# Patient Record
Sex: FEMALE | Race: WHITE | NOT HISPANIC OR LATINO | Employment: STUDENT | URBAN - METROPOLITAN AREA
[De-identification: names, ages, dates, MRNs, and addresses within clinical notes are randomized per-mention and may not be internally consistent; named-entity substitution may affect disease eponyms.]

---
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
## 2020-01-07 ENCOUNTER — OFFICE VISIT (OUTPATIENT)
Dept: FAMILY MEDICINE CLINIC | Facility: CLINIC | Age: 20
End: 2020-01-07

## 2020-01-07 VITALS
DIASTOLIC BLOOD PRESSURE: 87 MMHG | SYSTOLIC BLOOD PRESSURE: 133 MMHG | OXYGEN SATURATION: 100 % | HEART RATE: 76 BPM | WEIGHT: 149.9 LBS | HEIGHT: 63 IN | BODY MASS INDEX: 26.56 KG/M2 | TEMPERATURE: 97.1 F

## 2020-01-07 DIAGNOSIS — F90.0 ADHD, PREDOMINANTLY INATTENTIVE TYPE: Primary | ICD-10-CM

## 2020-01-07 PROCEDURE — 99213 OFFICE O/P EST LOW 20 MIN: CPT | Performed by: FAMILY MEDICINE

## 2020-01-07 RX ORDER — VALACYCLOVIR HYDROCHLORIDE 500 MG/1
500 TABLET, FILM COATED ORAL 2 TIMES DAILY
COMMUNITY
Start: 2019-11-27

## 2020-01-07 NOTE — PROGRESS NOTES
"Subjective   Raina Rodriguez is a 19 y.o. female.     Chief Complaint   Patient presents with   • ADHD        History of Present Illness    Follow-up attention deficit disorder.  She continues Vyvanse.  She ran out over the holidays.  She does not take it on weekends.  No troubles with weight loss from the medication.  No insomnia or agitation.  She is had some decreased motivation this last semester.  She is working as a nanny also.  She does not feel sad or depressed.  No depression symptoms.  Minimal to no alcohol use.  No marijuana or other drug use.  Otherwise feels well.      The following portions of the patient's history were reviewed and updated as appropriate: allergies, current medications, past family history, past medical history, past social history, past surgical history and problem list.          Review of Systems   Constitutional: Negative.    Respiratory: Negative.    Cardiovascular: Negative.    Musculoskeletal: Negative.        Objective   Blood pressure 133/87, pulse 76, temperature 97.1 °F (36.2 °C), temperature source Oral, height 160 cm (62.99\"), weight 68 kg (149 lb 14.4 oz), SpO2 100 %.  Physical Exam   Constitutional: She appears well-developed and well-nourished. No distress.   Neck: No thyromegaly present.   Cardiovascular: Normal rate, regular rhythm, normal heart sounds and intact distal pulses.   Pulmonary/Chest: Effort normal and breath sounds normal.   Musculoskeletal: She exhibits no edema.   Skin: Skin is warm and dry.   Psychiatric: She has a normal mood and affect. Her behavior is normal. Judgment and thought content normal.   Nursing note and vitals reviewed.      Assessment/Plan   Raina was seen today for adhd.    Diagnoses and all orders for this visit:    ADHD, predominantly inattentive type  -     Pain Management Profile (13 Drugs) Urine - Urine, Clean Catch    Other orders  -     lisdexamfetamine (VYVANSE) 30 MG capsule; Take 1 capsule by mouth Every Morning for attention " deficit disorder      Attention deficit disorder.  Continues Vyvanse.  Document neuropsychological testing on chart.  Routine urine drug screen ordered today per protocol.  I will see her back in about 4 months for recheck.  She will call with concerns.    She has had some decreased motivation the last semester.  No depression episodes or issues.  We will continue to monitor.

## 2020-01-08 LAB
AMPHETAMINES UR QL SCN: NEGATIVE NG/ML
BARBITURATES UR QL SCN: NEGATIVE NG/ML
BENZODIAZ UR QL SCN: NEGATIVE NG/ML
BZE UR QL SCN: NEGATIVE NG/ML
CANNABINOIDS UR QL SCN: NEGATIVE NG/ML
CREAT UR-MCNC: 155.5 MG/DL (ref 20–300)
FENTANYL+NORFENTANYL UR QL SCN: NEGATIVE PG/ML
LABORATORY COMMENT REPORT: NORMAL
MEPERIDINE UR QL: NEGATIVE NG/ML
METHADONE UR QL SCN: NEGATIVE NG/ML
OPIATES UR QL SCN: NEGATIVE NG/ML
OXYCODONE+OXYMORPHONE UR QL SCN: NEGATIVE NG/ML
PCP UR QL: NEGATIVE NG/ML
PH UR: 5.7 [PH] (ref 4.5–8.9)
PROPOXYPH UR QL SCN: NEGATIVE NG/ML
SP GR UR: 1.03
TRAMADOL UR QL SCN: NEGATIVE NG/ML

## 2020-01-09 NOTE — PROGRESS NOTES
The urine drug screen is negative as expected.  Patient has been off her Vyvanse for a number of weeks.

## 2020-04-03 ENCOUNTER — TELEPHONE (OUTPATIENT)
Dept: FAMILY MEDICINE CLINIC | Facility: CLINIC | Age: 20
End: 2020-04-03

## 2020-05-12 ENCOUNTER — TELEMEDICINE (OUTPATIENT)
Dept: FAMILY MEDICINE CLINIC | Facility: CLINIC | Age: 20
End: 2020-05-12

## 2020-05-12 DIAGNOSIS — F90.0 ADHD, PREDOMINANTLY INATTENTIVE TYPE: Primary | ICD-10-CM

## 2020-05-12 DIAGNOSIS — B35.4 TINEA CORPORIS: ICD-10-CM

## 2020-05-12 PROCEDURE — 99213 OFFICE O/P EST LOW 20 MIN: CPT | Performed by: FAMILY MEDICINE

## 2020-05-12 RX ORDER — CLOTRIMAZOLE 1 %
CREAM (GRAM) TOPICAL 2 TIMES DAILY
Qty: 30 G | Refills: 1 | Status: SHIPPED | OUTPATIENT
Start: 2020-05-12 | End: 2022-05-18

## 2020-05-12 NOTE — PROGRESS NOTES
"Subjective   Raina Rodriguez is a 20 y.o. female.     Chief Complaint   Patient presents with   • Rash        History of Present Illness    Coronavirus pandemic telehealth visit.  Good audio and video connection.  Patient gave informed consent through the psicofxp check in process.    Rash.  24 hours.  On back.  Small patch.  Itchy.  Her roommate a month ago had \"ringworm\" that was reportedly on 2 or 3 spots of her roommates body.  But the patient has not been living with roommate for a few weeks now.  She otherwise feels fine.    Attention deficit disorder.  She continues Vyvanse.  She is continuing taking it through the summer for summer classes.  No agitation.  No palpitations.  No side effects.  She has tolerated Vyvanse well in the past.  Drug screen panel was negative at last visit.  Consistent with therapy.      The following portions of the patient's history were reviewed and updated as appropriate: allergies, current medications, past family history, past medical history, past social history, past surgical history and problem list.          Review of Systems   Constitutional: Negative.    Respiratory: Negative.    Skin: Positive for rash.   Psychiatric/Behavioral: Negative.        Objective   There were no vitals taken for this visit.  Physical Exam   Constitutional: She is oriented to person, place, and time.   HENT:   Head: Atraumatic.   Neck: Normal range of motion.   Pulmonary/Chest: Effort normal. No respiratory distress.   Neurological: She is alert and oriented to person, place, and time. Coordination normal.   Skin: No pallor.   Patient sent a detailed photographs.  There is a slightly erythematous scaly patch with central clearing.  It appears to be on her back.  She confirms this.  No other rashes.   Psychiatric: She has a normal mood and affect. Her behavior is normal.       Assessment/Plan   Raina was seen today for rash.    Diagnoses and all orders for this visit:    ADHD, predominantly inattentive " type    Tinea corporis    Other orders  -     clotrimazole (LOTRIMIN) 1 % cream; Apply  topically to the appropriate area as directed 2 (Two) Times a Day.      Tinea corporis.  Clotrimazole cream twice a day for least a week or 2.  If not improving to let us know.  Differential diagnosis includes early pityriasis rosea.  This was explained to the patient.  If the rash becomes more prominent she will contact us.    Attention deficit disorder.  She continues to do well on Vyvanse.  No side effects.  Refill was given earlier this week.  I should see her before school restarts this autumn.    Estimated duration of visit 15 minutes

## 2020-12-18 NOTE — TELEPHONE ENCOUNTER
Needs ADD visit. In office or Mychart video. Before next refill. Sent refill today. If contract needs to be renewed, will need OV>

## 2020-12-28 ENCOUNTER — OFFICE VISIT (OUTPATIENT)
Dept: FAMILY MEDICINE CLINIC | Facility: CLINIC | Age: 20
End: 2020-12-28

## 2020-12-28 VITALS
HEIGHT: 64 IN | OXYGEN SATURATION: 100 % | TEMPERATURE: 97.1 F | HEART RATE: 72 BPM | WEIGHT: 154.2 LBS | BODY MASS INDEX: 26.32 KG/M2 | DIASTOLIC BLOOD PRESSURE: 76 MMHG | SYSTOLIC BLOOD PRESSURE: 130 MMHG

## 2020-12-28 DIAGNOSIS — F90.0 ADHD, PREDOMINANTLY INATTENTIVE TYPE: Primary | ICD-10-CM

## 2020-12-28 DIAGNOSIS — Z23 NEED FOR VACCINATION: ICD-10-CM

## 2020-12-28 DIAGNOSIS — Z00.00 HEALTH CARE MAINTENANCE: ICD-10-CM

## 2020-12-28 PROCEDURE — 99213 OFFICE O/P EST LOW 20 MIN: CPT | Performed by: FAMILY MEDICINE

## 2020-12-28 PROCEDURE — 90686 IIV4 VACC NO PRSV 0.5 ML IM: CPT | Performed by: FAMILY MEDICINE

## 2020-12-28 PROCEDURE — 90471 IMMUNIZATION ADMIN: CPT | Performed by: FAMILY MEDICINE

## 2020-12-28 NOTE — PROGRESS NOTES
"Subjective   Raina Rodriguez is a 20 y.o. female.     Chief Complaint   Patient presents with   • ADHD     med check         History of Present Illness    Follow-up attention deficit disorder.  Neuropsychological testing on chart.  She continues Vyvanse 30 mg daily without side effect.  No agitation.  No anxiety.  No palpitations.  She only takes it during school days.  Not on weekends.  She describes some alcohol use on weekends.  Up to 4 beers at a time.  She continues her birth control pills prescribed by gynecology.  She describes no drug use including no marijuana use.  Her urine drug screen was negative for illicit substances earlier this year.      The following portions of the patient's history were reviewed and updated as appropriate: allergies, current medications, past family history, past medical history, past social history, past surgical history and problem list.          Review of Systems   Constitutional: Negative.    Respiratory: Negative.    Cardiovascular: Negative.    Psychiatric/Behavioral: Negative.        Objective   Blood pressure 130/76, pulse 72, temperature 97.1 °F (36.2 °C), temperature source Temporal, height 162.6 cm (64.02\"), weight 69.9 kg (154 lb 3.2 oz), SpO2 100 %.  Physical Exam  Vitals signs and nursing note reviewed.   Constitutional:       General: She is not in acute distress.     Appearance: She is well-developed.   Neck:      Thyroid: No thyroid mass, thyromegaly or thyroid tenderness.      Comments: Thyroid exam unremarkable  Cardiovascular:      Rate and Rhythm: Normal rate and regular rhythm.      Heart sounds: Normal heart sounds.   Pulmonary:      Effort: Pulmonary effort is normal.      Breath sounds: Normal breath sounds.   Skin:     General: Skin is warm and dry.   Psychiatric:         Behavior: Behavior normal.         Thought Content: Thought content normal.         Judgment: Judgment normal.         Assessment/Plan   Diagnoses and all orders for this visit:    1. " ADHD, predominantly inattentive type (Primary)  -     CBC & Differential; Future  -     Comprehensive Metabolic Panel; Future  -     Lipid Panel; Future    2. Need for vaccination  -     FluLaval Quad >6 Months (1023-8898)    3. Health care maintenance  -     CBC & Differential; Future  -     Comprehensive Metabolic Panel; Future  -     Lipid Panel; Future    Attention deficit disorder.  She continues on Vyvanse 30 mg daily without side effect.  Pharmacy log reviewed.  Her drug screen was negative a number of months ago with no current red flag concerns.  I will see her back in 6 months for complete physical examination with lab work prior.  Alcohol use counseling given.

## 2021-06-30 ENCOUNTER — OFFICE VISIT (OUTPATIENT)
Dept: FAMILY MEDICINE CLINIC | Facility: CLINIC | Age: 21
End: 2021-06-30

## 2021-06-30 VITALS
SYSTOLIC BLOOD PRESSURE: 147 MMHG | HEART RATE: 70 BPM | WEIGHT: 155.7 LBS | HEIGHT: 64 IN | OXYGEN SATURATION: 99 % | BODY MASS INDEX: 26.58 KG/M2 | DIASTOLIC BLOOD PRESSURE: 92 MMHG | TEMPERATURE: 97.3 F

## 2021-06-30 DIAGNOSIS — Z00.00 HEALTH CARE MAINTENANCE: Primary | ICD-10-CM

## 2021-06-30 PROCEDURE — 99395 PREV VISIT EST AGE 18-39: CPT | Performed by: FAMILY MEDICINE

## 2021-06-30 NOTE — PROGRESS NOTES
"Chief Complaint  Annual Exam    Subjective          Raina Rodriguez presents to [facility]  History of Present Illness     Here for annual healthcare maintenance visit and recheck on Vyvanse/ADD.  Neuropsychological testing on chart.  No tobacco use.  No marijuana use.  She drinks alcohol on weekends.  3 or 4 drinks on Friday night and 3 or 4 drinks on Saturday night.  She is a college student.  She has an apartment off campus.  She does not share her medication with anyone and she keeps it hidden.  She has had no insomnia or agitation.  No asthma.  No irregular heartbeats.  No changes in her stool.  No changes in urination with no blood in the urine.  She states her menstrual cycle is regular.  She continues on OCPs.  She also uses condoms.  She continues to follow with her gynecologist.    She continues taking her Vyvanse on weekdays.  Not on weekends when she is not working or in school.  She is also taking a online course this summer.  She has not had her COVID-19 vaccination yet but is going to get it soon she states.    Objective   Vital Signs:   /92   Pulse 70   Temp 97.3 °F (36.3 °C) (Temporal)   Ht 162.6 cm (64.02\")   Wt 70.6 kg (155 lb 11.2 oz)   SpO2 99%   BMI 26.71 kg/m²     Physical Exam  Constitutional:       Appearance: Normal appearance.   HENT:      Head: Atraumatic.   Eyes:      Conjunctiva/sclera: Conjunctivae normal.   Cardiovascular:      Rate and Rhythm: Normal rate and regular rhythm.      Pulses: Normal pulses.      Heart sounds: Normal heart sounds.   Pulmonary:      Effort: Pulmonary effort is normal.      Breath sounds: Normal breath sounds.   Abdominal:      General: Abdomen is flat. There is no distension.      Palpations: Abdomen is soft. There is no mass.      Tenderness: There is no abdominal tenderness.      Hernia: No hernia is present.   Musculoskeletal:         General: Normal range of motion.      Cervical back: Normal range of motion and " neck supple. No muscular tenderness.   Lymphadenopathy:      Cervical: No cervical adenopathy.   Skin:     General: Skin is warm and dry.      Findings: No rash.   Neurological:      General: No focal deficit present.      Mental Status: She is alert and oriented to person, place, and time.   Psychiatric:         Mood and Affect: Mood normal.         Behavior: Behavior normal.        Result Review :   The following data was reviewed by: Federico Mai MD on 06/30/2021:  Common labs    Common Labsle 6/21/21 6/21/21 6/21/21    0947 [id number] 0947   Glucose  119 (A)    BUN  11    Creatinine  0.91    eGFR Non  Am  78    eGFR African Am  95    Sodium  143    Potassium  4.4    Chloride  107    Calcium  9.3    Total Protein  6.2    Albumin  4.40    Total Bilirubin  0.2    Alkaline Phosphatase  47    AST (SGOT)  16    ALT (SGPT)  20    WBC 4.02     Hemoglobin 13.8     Hematocrit 41.6     Platelets 168     Total Cholesterol   151   Triglycerides   85   HDL Cholesterol   55   LDL Cholesterol    80   (A) Abnormal value       Comments are available for some flowsheets but are not being displayed.                     Assessment and Plan    Diagnoses and all orders for this visit:    1. Health care maintenance (Primary)      Annual healthcare maintenance visit.    Immunizations.  She understands that she needs to get a COVID-19 vaccine soon.  She states she is going to get one.  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    Attention deficit disorder.  She is doing well on Vyvanse daily, with the holidays on weekends.  Prescribers agreement up-to-date..  Her routine drug screen less than a year ago revealed no illicit substances.    Elevated blood pressure readings.  I am concerned about undiagnosed hypertension.  Possible side effect of Vyvanse.  May also just be white coat syndrome.  She is going to be purchasing a home blood pressure monitor and sending the numbers into us in about a month.  See patient discharge  instructions.    Preventative health practices discussed including safe sex with condom use.  I will see her back in about 4 months for recheck.          Follow Up   No follow-ups on file.  Patient was given instructions and counseling regarding her condition or for health maintenance advice. Please see specific information pulled into the AVS if appropriate.

## 2021-06-30 NOTE — PATIENT INSTRUCTIONS
Your blood pressure has been elevated 2 of the last 3 visits.  This may be related to just being at the doctor.  However the Vyvanse can raise blood pressure.  We need to figure out more numbers.  I would like you to purchase a home blood pressure monitor, [employer] is a good brand.  They run about $35-$40 at [employer] or [employer].  Check your blood pressure daily for the next 3 or 4 weeks.  May be 1 day check in the morning, next day check in the evening.  And then send me the blood pressure numbers via the [employer] service.  You can type them into a [employer] message to me, or attach a PDF spreadsheet.  I will see you back in 3 or 4 months over the Thanksgiving break, but I need the blood pressure readings sooner.    And also most importantly, get your COVID-19 vaccination soon.

## 2021-08-20 ENCOUNTER — OFFICE VISIT (OUTPATIENT)
Dept: FAMILY MEDICINE CLINIC | Facility: CLINIC | Age: 21
End: 2021-08-20

## 2021-08-20 VITALS
SYSTOLIC BLOOD PRESSURE: 146 MMHG | TEMPERATURE: 97.7 F | OXYGEN SATURATION: 97 % | BODY MASS INDEX: 26.7 KG/M2 | HEART RATE: 81 BPM | WEIGHT: 156.4 LBS | HEIGHT: 64 IN | DIASTOLIC BLOOD PRESSURE: 100 MMHG

## 2021-08-20 DIAGNOSIS — F90.0 ADHD, PREDOMINANTLY INATTENTIVE TYPE: Primary | ICD-10-CM

## 2021-08-20 DIAGNOSIS — R03.0 ELEVATED BLOOD PRESSURE READING: ICD-10-CM

## 2021-08-20 PROCEDURE — 99213 OFFICE O/P EST LOW 20 MIN: CPT | Performed by: FAMILY MEDICINE

## 2021-08-20 RX ORDER — AMOXICILLIN AND CLAVULANATE POTASSIUM 875; 125 MG/1; MG/1
TABLET, FILM COATED ORAL
COMMUNITY
Start: 2021-08-17 | End: 2021-12-13

## 2021-08-20 NOTE — PATIENT INSTRUCTIONS
As we talked about, your blood pressure has been elevated in the office.  As you know, you last took the Vyvanse 1 week ago, so now with the blood pressure being elevated its not related to the medication at least today.    I want you to hold off the Vyvanse this coming week.  I would like you to check your blood pressure at least twice a day.  Make sure you sit for 5 minutes 1st.  Check at different times of the day.  Do not check it immediately after exercise.  And then I would like you to send me the numbers in the next 10 days or so.  Just type them into a nonurgent medical question on the [employer] system, or attach it as a JPEG or PDF.    We will then touch base by phone or Internet message, and then further decide what to do.    Also, stop taking Mucinex products or any other decongestant.  They can also raise your blood pressure.

## 2021-08-20 NOTE — PROGRESS NOTES
"Chief Complaint  ADHD    Subjective          Raina Rodriguez presents to [facility]  History of Present Illness     ADD follow-up.  She has not taken the Vyvanse in the last 6 or 7 days.  At least.  She starts off school next week.  Her neuropsychological testing is documented on the chart.  Her contract is up-to-date.  She has had no adverse effects from taking Vyvanse.  No headaches.  No chest pain.  No palpitations.  No agitation.  Her blood pressures been elevated the last couple of visits.  At the last check I asked her to get a home blood pressure monitor.  She did but she has not checked her blood pressure.  She states she was seen recently at a retail clinic for a reported sinus infection.  She was placed on antibiotics and felt better.  She states she is had some sinus drainage but is getting better.  She has been using Mucinex D the last couple of days.  She has not had her COVID-19 vaccine.  Family history of hypertension.      Objective   Vital Signs:   /100   Pulse 81   Temp 97.7 °F (36.5 °C) (Temporal)   Ht 162.6 cm (64.02\")   Wt 70.9 kg (156 lb 6.4 oz)   SpO2 97%   BMI 26.83 kg/m²     Physical Exam  Vitals and nursing note reviewed.   Constitutional:       General: She is not in acute distress.     Appearance: She is well-developed.   Cardiovascular:      Rate and Rhythm: Normal rate and regular rhythm.      Heart sounds: Normal heart sounds.   Pulmonary:      Effort: Pulmonary effort is normal.      Breath sounds: Normal breath sounds.   Skin:     General: Skin is warm and dry.   Psychiatric:         Behavior: Behavior normal.         Thought Content: Thought content normal.         Judgment: Judgment normal.        Repeat blood pressure is 130/90.    Result Review :                 Assessment and Plan    Diagnoses and all orders for this visit:    1. ADHD, predominantly inattentive type (Primary)    2. Elevated blood pressure reading      Attention deficit " disorder.  She is going to hold her Vyvanse next week and check her blood pressure on a regular basis.  See patient discharge instructions.  She is going to send me her blood pressure readings.  She also has stopped taking the current decongestant.  She is also going to get a COVID-19 vaccine in the next couple of days.  If the blood pressure continues to be elevated, will have to reassess the Vyvanse use.  And possibly treat hypertension.  At this point may be white coat syndrome exacerbated by recent decongestant use.      Follow Up   No follow-ups on file.  Patient was given instructions and counseling regarding her condition or for health maintenance advice. Please see specific information pulled into the AVS if appropriate.

## 2021-08-30 NOTE — TELEPHONE ENCOUNTER
Rx Refill Note  Requested Prescriptions     Pending Prescriptions Disp Refills   • lisdexamfetamine (Vyvanse) 30 MG capsule 30 capsule 0     Sig: Take 1 capsule by mouth Every Morning for attention deficit disorder      Last office visit with prescribing clinician: 8/20/2021      Next office visit with prescribing clinician: 11/23/2021            Coni Valenzuela MA  08/30/21, 10:42 EDT

## 2021-10-14 NOTE — TELEPHONE ENCOUNTER
Rx Refill Note  Requested Prescriptions     Pending Prescriptions Disp Refills   • lisdexamfetamine (Vyvanse) 30 MG capsule 30 capsule 0     Sig: Take 1 capsule by mouth Every Morning for attention deficit disorder      Last office visit with prescribing clinician: 8/20/2021      Next office visit with prescribing clinician: 11/23/2021            Coni Valenzuela MA  10/14/21, 10:53 EDT

## 2021-11-18 NOTE — TELEPHONE ENCOUNTER
Rx Refill Note  Requested Prescriptions     Pending Prescriptions Disp Refills   • lisdexamfetamine (Vyvanse) 30 MG capsule 30 capsule 0     Sig: Take 1 capsule by mouth Every Morning for attention deficit disorder      Last office visit with prescribing clinician: 8/20/2021      Next office visit with prescribing clinician: 11/23/2021            Coni Valenzuela MA  11/18/21, 09:34 EST

## 2021-12-13 ENCOUNTER — OFFICE VISIT (OUTPATIENT)
Dept: FAMILY MEDICINE CLINIC | Facility: CLINIC | Age: 21
End: 2021-12-13

## 2021-12-13 VITALS
DIASTOLIC BLOOD PRESSURE: 90 MMHG | TEMPERATURE: 97.1 F | WEIGHT: 155.6 LBS | BODY MASS INDEX: 26.56 KG/M2 | HEART RATE: 75 BPM | OXYGEN SATURATION: 100 % | SYSTOLIC BLOOD PRESSURE: 130 MMHG | HEIGHT: 64 IN

## 2021-12-13 DIAGNOSIS — R03.0 ELEVATED BLOOD PRESSURE READING: ICD-10-CM

## 2021-12-13 DIAGNOSIS — F90.0 ADHD, PREDOMINANTLY INATTENTIVE TYPE: Primary | ICD-10-CM

## 2021-12-13 PROCEDURE — 99213 OFFICE O/P EST LOW 20 MIN: CPT | Performed by: FAMILY MEDICINE

## 2021-12-13 NOTE — PROGRESS NOTES
"Chief Complaint  ADHD (f/u )    Subjective          Raina Rodriguez presents to [facility]  History of Present Illness     Follow-up attention deficit disorder.  She continues Vyvanse 30 mg a day.  Weekdays only.  Does not take it when she is not going to school.  She is done with her finals.  She did not take her medication today.  Her blood pressure has been elevated here previous visits.  However at home runs between 120-130/70-80 with or without medication.  She does not have any symptoms with the elevated blood pressure readings.  No bad headaches.  No tobacco products.  No vaping.  Notes THC products.  No other drug use.  She is has 3 or 4 beers twice a week at college she states.  Her urine drug screen in the last year or so was unremarkable.  Her medication is sent to [place].  Where she goes to college.  I do not have access to the pharmacy log there.  No red flag behavior.        Objective   Vital Signs:   /90   Pulse 75   Temp 97.1 °F (36.2 °C) (Temporal)   Ht 162.6 cm (64.02\")   Wt 70.6 kg (155 lb 9.6 oz)   SpO2 100%   BMI 26.70 kg/m²     Physical Exam  Vitals and nursing note reviewed.   Constitutional:       General: She is not in acute distress.     Appearance: She is well-developed.   Cardiovascular:      Rate and Rhythm: Normal rate and regular rhythm.      Heart sounds: Normal heart sounds.   Pulmonary:      Effort: Pulmonary effort is normal.      Breath sounds: Normal breath sounds.   Skin:     General: Skin is warm and dry.   Psychiatric:         Mood and Affect: Mood normal.        Result Review :                     Assessment and Plan    Diagnoses and all orders for this visit:    1. ADHD, predominantly inattentive type (Primary)    2. Elevated blood pressure reading      Attention absent disorder.  She will continue Vyvanse as directed.  She will not take it when she is not in school or on weekends.    Transient elevated blood pressure readings.  " They are running normal at home with her without the medication.  She did not take her medicine today and her blood pressure is elevated.  Repeat blood pressure is closer to normal at 130/90.  I want her to continue to check her blood pressure on a periodic basis.  If she has headaches or other severe symptoms with an elevated blood pressure is going to seek medical attention, let us know.  Otherwise I will see her back in April for recheck.      Follow Up   No follow-ups on file.  Patient was given instructions and counseling regarding her condition or for health maintenance advice. Please see specific information pulled into the AVS if appropriate.

## 2021-12-14 NOTE — TELEPHONE ENCOUNTER
Rx Refill Note  Requested Prescriptions     Pending Prescriptions Disp Refills   • lisdexamfetamine (Vyvanse) 30 MG capsule 30 capsule 0     Sig: Take 1 capsule by mouth Every Morning for attention deficit disorder      Last office visit with prescribing clinician: 12/13/2021      Next office visit with prescribing clinician: 4/18/2022            Coni Valenzuela MA  12/14/21, 11:54 EST

## 2021-12-22 NOTE — TELEPHONE ENCOUNTER
Caller: Raina Rodriguez    Relationship: Self    Best call back number: [phone]     Requested Prescriptions:   Requested Prescriptions     Pending Prescriptions Disp Refills   • lisdexamfetamine (Vyvanse) 30 MG capsule 30 capsule 0     Sig: Take 1 capsule by mouth Every Morning for attention deficit disorder        Pharmacy where request should be sent:  [facility]  [address], [place] [address]   [phone]    Additional details provided by patient: OUT  Does the patient have less than a 3 day supply:  [x] Yes  [] No    Bartolo Chase   12/22/21 12:17 EST

## 2021-12-22 NOTE — TELEPHONE ENCOUNTER
Rx Refill Note  Requested Prescriptions     Pending Prescriptions Disp Refills   • lisdexamfetamine (Vyvanse) 30 MG capsule 30 capsule 0     Sig: Take 1 capsule by mouth Every Morning for attention deficit disorder      Last office visit with prescribing clinician: 12/13/2021      Next office visit with prescribing clinician: 4/18/2022            Coni Valenzuela MA  12/22/21, 14:23 EST

## 2022-02-21 NOTE — TELEPHONE ENCOUNTER
Rx Refill Note  Requested Prescriptions     Pending Prescriptions Disp Refills   • lisdexamfetamine (Vyvanse) 30 MG capsule 30 capsule 0     Sig: Take 1 capsule by mouth Every Morning for attention deficit disorder      Last office visit with prescribing clinician: 12/13/2021      Next office visit with prescribing clinician: 4/18/2022            Coni Valenzuela MA  02/21/22, 16:01 EST

## 2022-04-04 NOTE — TELEPHONE ENCOUNTER
Rx Refill Note  Requested Prescriptions     Pending Prescriptions Disp Refills   • lisdexamfetamine (Vyvanse) 30 MG capsule 30 capsule 0     Sig: Take 1 capsule by mouth Every Morning for attention deficit disorder      Last office visit with prescribing clinician: 12/13/2021      Next office visit with prescribing clinician: 4/18/2022            Coni Valenzuela MA  04/04/22, 10:48 EDT

## 2022-05-12 NOTE — TELEPHONE ENCOUNTER
Patient needs appointment for further refills.  Please let patient know we can send in a bridge prescription as soon as follow-up appointment is made

## 2022-05-12 NOTE — TELEPHONE ENCOUNTER
Patient needs appointment for further refills.  Please let patient know we can send in a bridge prescription as soon as follow-up appointment is made.  Needs office visit, she should be done with her semester at [employer].

## 2022-05-12 NOTE — TELEPHONE ENCOUNTER
Rx Refill Note  Requested Prescriptions     Pending Prescriptions Disp Refills   • lisdexamfetamine (Vyvanse) 30 MG capsule 30 capsule 0     Sig: Take 1 capsule by mouth Every Morning for attention deficit disorder      Last office visit with prescribing clinician: 12/13/2021      Next office visit with prescribing clinician: Visit date not found            Mil Hayes  05/12/22, 08:44 EDT

## 2022-05-18 ENCOUNTER — OFFICE VISIT (OUTPATIENT)
Dept: FAMILY MEDICINE CLINIC | Facility: CLINIC | Age: 22
End: 2022-05-18

## 2022-05-18 VITALS
SYSTOLIC BLOOD PRESSURE: 135 MMHG | WEIGHT: 148 LBS | HEART RATE: 89 BPM | OXYGEN SATURATION: 100 % | DIASTOLIC BLOOD PRESSURE: 89 MMHG | BODY MASS INDEX: 25.27 KG/M2 | TEMPERATURE: 96.8 F | HEIGHT: 64 IN

## 2022-05-18 DIAGNOSIS — F90.0 ADHD, PREDOMINANTLY INATTENTIVE TYPE: Primary | ICD-10-CM

## 2022-05-18 PROCEDURE — 99213 OFFICE O/P EST LOW 20 MIN: CPT | Performed by: FAMILY MEDICINE

## 2022-05-18 NOTE — PROGRESS NOTES
"Chief Complaint  ADHD (Med refill )    Subjective          Raina Rodriguez presents to [facility]  History of Present Illness     Follow-up documented attention deficit disorder.  She continues on Vyvanse which she takes most work days or school days.  No adverse effects.  She states she took her Vyvanse dose this morning.  Agitation.  No insomnia.  No palpitations.  No racing heartbeats.  No depression or severe anxiety symptoms.  She has 1 more semester left in school.  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  She states she is up-to-date on her COVID-19 vaccination.  She states she going to send us a copy of it.  She has no concerns about her health.    Objective   Vital Signs:  /89   Pulse 89   Temp 96.8 °F (36 °C) (Temporal)   Ht 162.6 cm (64.02\")   Wt 67.1 kg (148 lb)   SpO2 100%   BMI 25.39 kg/m²         Physical Exam  Constitutional:       Appearance: Normal appearance.   Cardiovascular:      Rate and Rhythm: Normal rate and regular rhythm.      Pulses: Normal pulses.      Heart sounds: Normal heart sounds. No murmur heard.  Pulmonary:      Effort: Pulmonary effort is normal.   Neurological:      Mental Status: She is alert.   Psychiatric:         Mood and Affect: Mood normal.        Result Review :                 Assessment and Plan    Diagnoses and all orders for this visit:    1. ADHD, predominantly inattentive type (Primary)  -     Compliance Drug Analysis, Ur - Urine, Clean Catch; Future  -     lisdexamfetamine (Vyvanse) 30 MG capsule; Take 1 capsule by mouth Every Morning for attention deficit disorder  Dispense: 30 capsule; Refill: 0      Attention deficit disorder.  She is doing well with the medication.  No adverse effects.  I have ordered routine urine drug screen today.  She denies THC or other drug use.  She states she took her Vyvanse this morning.  She states she will have 3 drinks of alcohol on weekends.  I will see her back in the end of the summer " before she goes back to college.  Note she uses both local pharmacies and a pharmacy in [place].  Her prescribers agreement is good until August.           Follow Up   No follow-ups on file.  Patient was given instructions and counseling regarding her condition or for health maintenance advice. Please see specific information pulled into the AVS if appropriate.

## 2022-05-25 LAB — DRUGS UR: NORMAL

## 2022-06-23 DIAGNOSIS — F90.0 ADHD, PREDOMINANTLY INATTENTIVE TYPE: ICD-10-CM

## 2022-06-23 NOTE — TELEPHONE ENCOUNTER
Rx Refill Note  Requested Prescriptions     Pending Prescriptions Disp Refills   • lisdexamfetamine (Vyvanse) 30 MG capsule 30 capsule 0     Sig: Take 1 capsule by mouth Every Morning for attention deficit disorder      Last office visit with prescribing clinician: 5/18/2022      Next office visit with prescribing clinician: 8/19/2022            Mil Hayes  06/23/22, 09:56 EDT

## 2022-08-23 DIAGNOSIS — F90.0 ADHD, PREDOMINANTLY INATTENTIVE TYPE: ICD-10-CM

## 2022-08-23 NOTE — TELEPHONE ENCOUNTER
Rx Refill Note  Requested Prescriptions     Pending Prescriptions Disp Refills   • lisdexamfetamine (Vyvanse) 30 MG capsule 30 capsule 0     Sig: Take 1 capsule by mouth Every Morning for attention deficit disorder      Last office visit with prescribing clinician: 5/18/2022      Next office visit with prescribing clinician: Visit date not found            Mil Hayes  08/23/22, 11:05 EDT

## 2022-08-23 NOTE — TELEPHONE ENCOUNTER
Patient was supposed to see me in the office prior to going back to college.  I believe also her prescribers agreement is not up-to-date.  She would need to see me in the office.  If she has an appointment available, I can send her a bridge prescription.

## 2022-08-30 DIAGNOSIS — F90.0 ADHD, PREDOMINANTLY INATTENTIVE TYPE: ICD-10-CM

## 2022-08-30 NOTE — TELEPHONE ENCOUNTER
Rx Refill Note  Requested Prescriptions     Pending Prescriptions Disp Refills   • lisdexamfetamine (Vyvanse) 30 MG capsule 30 capsule 0     Sig: Take 1 capsule by mouth Every Morning for attention deficit disorder      Last office visit with prescribing clinician: 5/18/2022      Next office visit with prescribing clinician: 9/27/2022            Mil Hayes  08/30/22, 10:37 EDT

## 2022-08-30 NOTE — TELEPHONE ENCOUNTER
Caller: Raina Rodriguez    Relationship: Self    Best call back number: [phone]    Requested Prescriptions:   Requested Prescriptions     Pending Prescriptions Disp Refills   • lisdexamfetamine (Vyvanse) 30 MG capsule 30 capsule 0     Sig: Take 1 capsule by mouth Every Morning for attention deficit disorder        Pharmacy where request should be sent: [id number] [place], [place] - [address] [place] [address] AT [facility] - [phone] [facility] [phone]      Additional details provided by patient: PATIENT IS OUT OF THE MEDICATION    PATIENT SCHEDULED APPOINTMENT FOR 09/27/2022 BUT WANTS TO KNOW IF MEDICATION CAN BE REFILLED BEFORE HER APPOINTMENT.    PLEASE CALL TO DISCUSS AND ADVISE.    Does the patient have less than a 3 day supply:  [x] Yes  [] No    Delta Collier Rep   08/30/22 09:20 EDT

## 2022-09-27 ENCOUNTER — OFFICE VISIT (OUTPATIENT)
Dept: FAMILY MEDICINE CLINIC | Facility: CLINIC | Age: 22
End: 2022-09-27

## 2022-09-27 VITALS
HEART RATE: 64 BPM | OXYGEN SATURATION: 100 % | HEIGHT: 64 IN | SYSTOLIC BLOOD PRESSURE: 120 MMHG | DIASTOLIC BLOOD PRESSURE: 80 MMHG | TEMPERATURE: 97.5 F | BODY MASS INDEX: 27.25 KG/M2 | WEIGHT: 159.6 LBS

## 2022-09-27 DIAGNOSIS — F90.0 ADHD, PREDOMINANTLY INATTENTIVE TYPE: Primary | ICD-10-CM

## 2022-09-27 PROCEDURE — 99213 OFFICE O/P EST LOW 20 MIN: CPT | Performed by: FAMILY MEDICINE

## 2022-09-27 NOTE — PROGRESS NOTES
"Chief Complaint  ADHD    Subjective        Raina Rodriguez presents to [facility]  History of Present Illness     Here for routine Vyvanse follow-up for attention deficit disorder.  She has neuropsychological testing on the chart.  Her prescribers agreement needs to be renewed today.  Her urine routine drug screen is up-to-date.  She states she continues to use no illicit substances.  No marijuana.  She has no concerns about her health today.  Her ADAM-7 and PHQ-9 screen is overall okay.  No palpitations or other adverse effects from the Vyvanse.  She states she takes it on weekdays only.  She does not take it to stay awake to study.  And nobody else touches her medication.    Objective   Vital Signs:  /80   Pulse 64   Temp 97.5 °F (36.4 °C) (Temporal)   Ht 162.6 cm (64.02\")   Wt 72.4 kg (159 lb 9.6 oz)   SpO2 100%   BMI 27.38 kg/m²   Estimated body mass index is 27.38 kg/m² as calculated from the following:    Height as of this encounter: 162.6 cm (64.02\").    Weight as of this encounter: 72.4 kg (159 lb 9.6 oz).          Physical Exam  Vitals and nursing note reviewed.   Constitutional:       General: She is not in acute distress.     Appearance: She is well-developed.   Cardiovascular:      Rate and Rhythm: Normal rate and regular rhythm.      Heart sounds: Normal heart sounds.   Pulmonary:      Effort: Pulmonary effort is normal.      Breath sounds: Normal breath sounds.   Musculoskeletal:      Cervical back: Normal range of motion.   Skin:     General: Skin is warm and dry.   Psychiatric:         Mood and Affect: Mood normal.        Result Review :                Assessment and Plan   Diagnoses and all orders for this visit:    1. ADHD, predominantly inattentive type (Primary)        ADHD.  She continues on Vyvanse without adverse effect.  Renewing prescribers agreement today.  I will see her back in about 4 months for recheck.  She will contact us with concerns.     "     Follow Up   No follow-ups on file.  Patient was given instructions and counseling regarding her condition or for health maintenance advice. Please see specific information pulled into the AVS if appropriate.

## 2022-10-10 DIAGNOSIS — F90.0 ADHD, PREDOMINANTLY INATTENTIVE TYPE: ICD-10-CM

## 2022-10-10 NOTE — TELEPHONE ENCOUNTER
Rx Refill Note  Requested Prescriptions     Pending Prescriptions Disp Refills   • lisdexamfetamine (Vyvanse) 30 MG capsule 30 capsule 0     Sig: Take 1 capsule by mouth Every Morning for attention deficit disorder      Last office visit with prescribing clinician: 9/27/2022      Next office visit with prescribing clinician: 1/9/2023            Delta Arreola Rep  10/10/22, 14:58 EDT

## 2022-11-21 DIAGNOSIS — F90.0 ADHD, PREDOMINANTLY INATTENTIVE TYPE: ICD-10-CM

## 2022-11-21 NOTE — TELEPHONE ENCOUNTER
Rx Refill Note  Requested Prescriptions     Pending Prescriptions Disp Refills   • lisdexamfetamine (Vyvanse) 30 MG capsule 30 capsule 0     Sig: Take 1 capsule by mouth Every Morning for attention deficit disorder      Last office visit with prescribing clinician: 9/27/2022      Next office visit with prescribing clinician: 1/9/2023            Mil Hayes  11/21/22, 10:31 EST

## 2023-01-18 DIAGNOSIS — F90.0 ADHD, PREDOMINANTLY INATTENTIVE TYPE: ICD-10-CM

## 2023-01-18 NOTE — TELEPHONE ENCOUNTER
Please let patient know she has to switch to a [place] pharmacy for me to continue to prescribe. Cannot do a [facility] in [place]. She can go to a Pharmcy in [place] if she wishes.... or back to [place]. Anywhere in [place]

## 2023-01-19 NOTE — TELEPHONE ENCOUNTER
Rx Refill Note  Requested Prescriptions     Pending Prescriptions Disp Refills   • lisdexamfetamine (Vyvanse) 30 MG capsule 30 capsule 0     Sig: Take 1 capsule by mouth Every Morning for attention deficit disorder      Last office visit with prescribing clinician: 9/27/2022   Last telemedicine visit with prescribing clinician: 2/6/2023   Next office visit with prescribing clinician: 2/6/2023                         Would you like a call back once the refill request has been completed: [] Yes [] No    If the office needs to give you a call back, can they leave a voicemail: [] Yes [] No    Mil Hayes  01/19/23, 15:03 EST

## 2023-02-13 ENCOUNTER — OFFICE VISIT (OUTPATIENT)
Dept: FAMILY MEDICINE CLINIC | Facility: CLINIC | Age: 23
End: 2023-02-13
Payer: COMMERCIAL

## 2023-02-13 VITALS
SYSTOLIC BLOOD PRESSURE: 149 MMHG | HEIGHT: 64 IN | HEART RATE: 89 BPM | OXYGEN SATURATION: 100 % | DIASTOLIC BLOOD PRESSURE: 98 MMHG | TEMPERATURE: 97.8 F | WEIGHT: 160.8 LBS | BODY MASS INDEX: 27.45 KG/M2

## 2023-02-13 DIAGNOSIS — Z00.00 HEALTH CARE MAINTENANCE: Primary | ICD-10-CM

## 2023-02-13 DIAGNOSIS — F90.0 ADHD, PREDOMINANTLY INATTENTIVE TYPE: ICD-10-CM

## 2023-02-13 PROCEDURE — 99395 PREV VISIT EST AGE 18-39: CPT | Performed by: FAMILY MEDICINE

## 2023-02-13 PROCEDURE — 0124A COVID-19 (PFIZER) BIVALENT BOOSTER 12+YRS: CPT | Performed by: FAMILY MEDICINE

## 2023-02-13 PROCEDURE — 91312 COVID-19 (PFIZER) BIVALENT BOOSTER 12+YRS: CPT | Performed by: FAMILY MEDICINE

## 2023-02-13 NOTE — PROGRESS NOTES
"Chief Complaint  ADHD and Annual Exam    Subjective        Raina Rodriguez presents to [facility]  History of Present Illness     Annual healthcare maintenance visit.  She continues on Vyvanse for attention deficit disorder.  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  She is doing well moving to [facility].  She has no concerns about her health today.  We reviewed her health maintenance checklist.  She continues to see her gynecologist.  No adverse effects from the Vyvanse.    Objective   Vital Signs:  /98   Pulse 89   Temp 97.8 °F (36.6 °C) (Temporal)   Ht 162.6 cm (64.02\")   Wt 72.9 kg (160 lb 12.8 oz)   SpO2 100%   BMI 27.58 kg/m²   Estimated body mass index is 27.58 kg/m² as calculated from the following:    Height as of this encounter: 162.6 cm (64.02\").    Weight as of this encounter: 72.9 kg (160 lb 12.8 oz).             Physical Exam  Constitutional:       Appearance: Normal appearance.   HENT:      Head: Atraumatic.      Mouth/Throat:      Pharynx: Oropharynx is clear. No oropharyngeal exudate or posterior oropharyngeal erythema.   Eyes:      Conjunctiva/sclera: Conjunctivae normal.   Cardiovascular:      Rate and Rhythm: Normal rate and regular rhythm.      Pulses: Normal pulses.      Heart sounds: Normal heart sounds.   Pulmonary:      Effort: Pulmonary effort is normal.      Breath sounds: Normal breath sounds.   Abdominal:      General: Abdomen is flat. There is no distension.      Palpations: Abdomen is soft. There is no mass.      Tenderness: There is no abdominal tenderness.      Hernia: No hernia is present.   Musculoskeletal:         General: Normal range of motion.      Cervical back: Normal range of motion and neck supple. No muscular tenderness.   Lymphadenopathy:      Cervical: No cervical adenopathy.   Skin:     General: Skin is warm and dry.      Findings: No rash.   Neurological:      General: No focal deficit present.      " Mental Status: She is alert and oriented to person, place, and time.   Psychiatric:         Mood and Affect: Mood normal.        Result Review :                     Assessment and Plan   Diagnoses and all orders for this visit:    1. Health care maintenance (Primary)    2. ADHD, predominantly inattentive type    Other orders  -     COVID-19 Bivalent Booster (Pfizer) 12+yrs      Annual healthcare maintenance visit.    Immunizations.  COVID-19 up-to-date booster today.    Attention deficit disorder.  She continues on Vyvanse without adverse effect.  She does not need a renewal today.  Her prescribers agreement is up-to-date.  I reviewed the prescribers log.  At one point she was getting her medication sent to a pharmacy in [place].  We have since changed that to her pharmacy in [place].  She will continue the Vyvanse.  I will see her back in 4 months for recheck.    She continues to follow her gynecologist on a routine basis.  She states she is not in need of birth control.  Also using condoms.    Other preventative health practices discussed.  Including exercise.  See me in 4 months.           Follow Up   No follow-ups on file.  Patient was given instructions and counseling regarding her condition or for health maintenance advice. Please see specific information pulled into the AVS if appropriate.

## 2023-03-01 DIAGNOSIS — F90.0 ADHD, PREDOMINANTLY INATTENTIVE TYPE: ICD-10-CM

## 2023-03-01 NOTE — TELEPHONE ENCOUNTER
Rx Refill Note  Requested Prescriptions     Pending Prescriptions Disp Refills   • lisdexamfetamine (Vyvanse) 30 MG capsule 30 capsule 0     Sig: Take 1 capsule by mouth Every Morning for attention deficit disorder      Last office visit with prescribing clinician: 2/13/2023   Last telemedicine visit with prescribing clinician: 6/20/2023   Next office visit with prescribing clinician: 6/20/2023                         Would you like a call back once the refill request has been completed: [] Yes [] No    If the office needs to give you a call back, can they leave a voicemail: [] Yes [] No    Mil Hayes  03/01/23, 11:55 EST

## 2023-04-14 DIAGNOSIS — F90.0 ADHD, PREDOMINANTLY INATTENTIVE TYPE: ICD-10-CM

## 2023-04-14 NOTE — TELEPHONE ENCOUNTER
Rx Refill Note  Requested Prescriptions     Pending Prescriptions Disp Refills   • lisdexamfetamine (Vyvanse) 30 MG capsule 30 capsule 0     Sig: Take 1 capsule by mouth Every Morning for attention deficit disorder      Last office visit with prescribing clinician: 2/13/2023   Last telemedicine visit with prescribing clinician: 6/20/2023   Next office visit with prescribing clinician: 6/20/2023                         Would you like a call back once the refill request has been completed: [] Yes [] No    If the office needs to give you a call back, can they leave a voicemail: [] Yes [] No    Mil Hayes  04/14/23, 14:49 EDT

## 2023-05-31 DIAGNOSIS — F90.0 ADHD, PREDOMINANTLY INATTENTIVE TYPE: ICD-10-CM

## 2023-05-31 NOTE — TELEPHONE ENCOUNTER
Rx Refill Note  Requested Prescriptions     Pending Prescriptions Disp Refills   • lisdexamfetamine (Vyvanse) 30 MG capsule 30 capsule 0     Sig: Take 1 capsule by mouth Every Morning for attention deficit disorder      Last office visit with prescribing clinician: 2/13/2023   Last telemedicine visit with prescribing clinician: Visit date not found   Next office visit with prescribing clinician: 6/20/2023                         Would you like a call back once the refill request has been completed: [] Yes [] No    If the office needs to give you a call back, can they leave a voicemail: [] Yes [] No    Mil Hayes  05/31/23, 11:27 EDT

## 2023-08-22 DIAGNOSIS — F90.0 ADHD, PREDOMINANTLY INATTENTIVE TYPE: ICD-10-CM

## 2023-08-22 NOTE — TELEPHONE ENCOUNTER
Rx Refill Note  Requested Prescriptions     Pending Prescriptions Disp Refills    lisdexamfetamine (Vyvanse) 30 MG capsule 30 capsule 0     Sig: Take 1 capsule by mouth Every Morning for attention deficit disorder      Last office visit with prescribing clinician: 2/13/2023   Last telemedicine visit with prescribing clinician: Visit date not found   Next office visit with prescribing clinician: Visit date not found                         Would you like a call back once the refill request has been completed: [] Yes [] No    If the office needs to give you a call back, can they leave a voicemail: [] Yes [] No    Mil Hayes  08/22/23, 16:32 EDT

## 2025-07-24 NOTE — TELEPHONE ENCOUNTER
No showed last appointment.  On controlled substance.  No further refills until seen in office.   Pt was called no answer, left message that she needs to call pharm